# Patient Record
Sex: FEMALE | Race: BLACK OR AFRICAN AMERICAN | Employment: FULL TIME | URBAN - METROPOLITAN AREA
[De-identification: names, ages, dates, MRNs, and addresses within clinical notes are randomized per-mention and may not be internally consistent; named-entity substitution may affect disease eponyms.]

---

## 2017-04-16 ENCOUNTER — APPOINTMENT (OUTPATIENT)
Dept: ULTRASOUND IMAGING | Facility: CLINIC | Age: 22
DRG: 770 | End: 2017-04-16
Attending: EMERGENCY MEDICINE
Payer: COMMERCIAL

## 2017-04-16 ENCOUNTER — ANESTHESIA (OUTPATIENT)
Dept: SURGERY | Facility: CLINIC | Age: 22
DRG: 770 | End: 2017-04-16
Payer: COMMERCIAL

## 2017-04-16 ENCOUNTER — HOSPITAL ENCOUNTER (INPATIENT)
Facility: CLINIC | Age: 22
LOS: 1 days | Discharge: HOME | DRG: 770 | End: 2017-04-17
Attending: EMERGENCY MEDICINE | Admitting: SPECIALIST
Payer: COMMERCIAL

## 2017-04-16 ENCOUNTER — ANESTHESIA EVENT (OUTPATIENT)
Dept: SURGERY | Facility: CLINIC | Age: 22
DRG: 770 | End: 2017-04-16
Payer: COMMERCIAL

## 2017-04-16 DIAGNOSIS — O03.87: Primary | ICD-10-CM

## 2017-04-16 LAB
ANION GAP SERPL CALCULATED.3IONS-SCNC: 9 MMOL/L (ref 3–14)
BASOPHILS # BLD AUTO: 0 10E9/L (ref 0–0.2)
BASOPHILS NFR BLD AUTO: 0.1 %
BUN SERPL-MCNC: 8 MG/DL (ref 7–30)
CALCIUM SERPL-MCNC: 9.1 MG/DL (ref 8.5–10.1)
CHLORIDE SERPL-SCNC: 110 MMOL/L (ref 94–109)
CO2 SERPL-SCNC: 22 MMOL/L (ref 20–32)
CREAT SERPL-MCNC: 0.67 MG/DL (ref 0.52–1.04)
DIFFERENTIAL METHOD BLD: ABNORMAL
EOSINOPHIL # BLD AUTO: 0.2 10E9/L (ref 0–0.7)
EOSINOPHIL NFR BLD AUTO: 1.1 %
ERYTHROCYTE [DISTWIDTH] IN BLOOD BY AUTOMATED COUNT: 14.6 % (ref 10–15)
GFR SERPL CREATININE-BSD FRML MDRD: ABNORMAL ML/MIN/1.7M2
GLUCOSE SERPL-MCNC: 98 MG/DL (ref 70–99)
HCT VFR BLD AUTO: 32.8 % (ref 35–47)
HGB BLD-MCNC: 11.4 G/DL (ref 11.7–15.7)
IMM GRANULOCYTES # BLD: 0 10E9/L (ref 0–0.4)
IMM GRANULOCYTES NFR BLD: 0.2 %
LACTATE SERPL-SCNC: 0.8 MMOL/L (ref 0.4–2)
LYMPHOCYTES # BLD AUTO: 2.4 10E9/L (ref 0.8–5.3)
LYMPHOCYTES NFR BLD AUTO: 17.9 %
MCH RBC QN AUTO: 29.8 PG (ref 26.5–33)
MCHC RBC AUTO-ENTMCNC: 34.8 G/DL (ref 31.5–36.5)
MCV RBC AUTO: 86 FL (ref 78–100)
MICRO REPORT STATUS: ABNORMAL
MONOCYTES # BLD AUTO: 1 10E9/L (ref 0–1.3)
MONOCYTES NFR BLD AUTO: 7.8 %
NEUTROPHILS # BLD AUTO: 9.7 10E9/L (ref 1.6–8.3)
NEUTROPHILS NFR BLD AUTO: 72.9 %
NRBC # BLD AUTO: 0 10*3/UL
NRBC BLD AUTO-RTO: 0 /100
PLATELET # BLD AUTO: 199 10E9/L (ref 150–450)
POTASSIUM SERPL-SCNC: 3.4 MMOL/L (ref 3.4–5.3)
RBC # BLD AUTO: 3.82 10E12/L (ref 3.8–5.2)
SODIUM SERPL-SCNC: 141 MMOL/L (ref 133–144)
SPECIMEN SOURCE: ABNORMAL
WBC # BLD AUTO: 13.4 10E9/L (ref 4–11)
WET PREP SPEC: ABNORMAL

## 2017-04-16 PROCEDURE — 88305 TISSUE EXAM BY PATHOLOGIST: CPT | Performed by: SPECIALIST

## 2017-04-16 PROCEDURE — 96365 THER/PROPH/DIAG IV INF INIT: CPT

## 2017-04-16 PROCEDURE — 25800025 ZZH RX 258: Performed by: EMERGENCY MEDICINE

## 2017-04-16 PROCEDURE — 27210995 ZZH RX 272: Performed by: SPECIALIST

## 2017-04-16 PROCEDURE — 40000170 ZZH STATISTIC PRE-PROCEDURE ASSESSMENT II: Performed by: SPECIALIST

## 2017-04-16 PROCEDURE — 36415 COLL VENOUS BLD VENIPUNCTURE: CPT

## 2017-04-16 PROCEDURE — 96375 TX/PRO/DX INJ NEW DRUG ADDON: CPT

## 2017-04-16 PROCEDURE — 99285 EMERGENCY DEPT VISIT HI MDM: CPT | Mod: 25

## 2017-04-16 PROCEDURE — 25800025 ZZH RX 258: Performed by: SPECIALIST

## 2017-04-16 PROCEDURE — 25000132 ZZH RX MED GY IP 250 OP 250 PS 637: Performed by: ANESTHESIOLOGY

## 2017-04-16 PROCEDURE — 25800025 ZZH RX 258: Performed by: ANESTHESIOLOGY

## 2017-04-16 PROCEDURE — 37000008 ZZH ANESTHESIA TECHNICAL FEE, 1ST 30 MIN: Performed by: SPECIALIST

## 2017-04-16 PROCEDURE — 88305 TISSUE EXAM BY PATHOLOGIST: CPT | Mod: 26 | Performed by: SPECIALIST

## 2017-04-16 PROCEDURE — 25000132 ZZH RX MED GY IP 250 OP 250 PS 637: Performed by: SPECIALIST

## 2017-04-16 PROCEDURE — 96367 TX/PROPH/DG ADDL SEQ IV INF: CPT

## 2017-04-16 PROCEDURE — 25000125 ZZHC RX 250: Performed by: EMERGENCY MEDICINE

## 2017-04-16 PROCEDURE — 85025 COMPLETE CBC W/AUTO DIFF WBC: CPT | Performed by: EMERGENCY MEDICINE

## 2017-04-16 PROCEDURE — 00000159 ZZHCL STATISTIC H-SEND OUTS PREP: Performed by: SPECIALIST

## 2017-04-16 PROCEDURE — 80048 BASIC METABOLIC PNL TOTAL CA: CPT | Performed by: EMERGENCY MEDICINE

## 2017-04-16 PROCEDURE — 25000128 H RX IP 250 OP 636: Performed by: ANESTHESIOLOGY

## 2017-04-16 PROCEDURE — 87070 CULTURE OTHR SPECIMN AEROBIC: CPT | Performed by: EMERGENCY MEDICINE

## 2017-04-16 PROCEDURE — 12000007 ZZH R&B INTERMEDIATE

## 2017-04-16 PROCEDURE — 25000128 H RX IP 250 OP 636: Performed by: EMERGENCY MEDICINE

## 2017-04-16 PROCEDURE — 25000125 ZZHC RX 250: Performed by: NURSE ANESTHETIST, CERTIFIED REGISTERED

## 2017-04-16 PROCEDURE — 87591 N.GONORRHOEAE DNA AMP PROB: CPT | Performed by: EMERGENCY MEDICINE

## 2017-04-16 PROCEDURE — 25000128 H RX IP 250 OP 636: Performed by: SPECIALIST

## 2017-04-16 PROCEDURE — 71000012 ZZH RECOVERY PHASE 1 LEVEL 1 FIRST HR: Performed by: SPECIALIST

## 2017-04-16 PROCEDURE — 87106 FUNGI IDENTIFICATION YEAST: CPT | Performed by: EMERGENCY MEDICINE

## 2017-04-16 PROCEDURE — 83605 ASSAY OF LACTIC ACID: CPT | Performed by: EMERGENCY MEDICINE

## 2017-04-16 PROCEDURE — 87040 BLOOD CULTURE FOR BACTERIA: CPT | Performed by: EMERGENCY MEDICINE

## 2017-04-16 PROCEDURE — 25000132 ZZH RX MED GY IP 250 OP 250 PS 637: Performed by: EMERGENCY MEDICINE

## 2017-04-16 PROCEDURE — 25000125 ZZHC RX 250: Performed by: SPECIALIST

## 2017-04-16 PROCEDURE — 27210794 ZZH OR GENERAL SUPPLY STERILE: Performed by: SPECIALIST

## 2017-04-16 PROCEDURE — 37000009 ZZH ANESTHESIA TECHNICAL FEE, EACH ADDTL 15 MIN: Performed by: SPECIALIST

## 2017-04-16 PROCEDURE — 25000128 H RX IP 250 OP 636: Performed by: NURSE ANESTHETIST, CERTIFIED REGISTERED

## 2017-04-16 PROCEDURE — 96376 TX/PRO/DX INJ SAME DRUG ADON: CPT

## 2017-04-16 PROCEDURE — 36000056 ZZH SURGERY LEVEL 3 1ST 30 MIN: Performed by: SPECIALIST

## 2017-04-16 PROCEDURE — 87210 SMEAR WET MOUNT SALINE/INK: CPT | Performed by: EMERGENCY MEDICINE

## 2017-04-16 PROCEDURE — 76801 OB US < 14 WKS SINGLE FETUS: CPT

## 2017-04-16 PROCEDURE — 10D18ZZ EXTRACTION OF PRODUCTS OF CONCEPTION, RETAINED, VIA NATURAL OR ARTIFICIAL OPENING ENDOSCOPIC: ICD-10-PCS | Performed by: SPECIALIST

## 2017-04-16 PROCEDURE — 36000058 ZZH SURGERY LEVEL 3 EA 15 ADDTL MIN: Performed by: SPECIALIST

## 2017-04-16 PROCEDURE — 87491 CHLMYD TRACH DNA AMP PROBE: CPT | Performed by: EMERGENCY MEDICINE

## 2017-04-16 PROCEDURE — 96361 HYDRATE IV INFUSION ADD-ON: CPT

## 2017-04-16 PROCEDURE — 25000566 ZZH SEVOFLURANE, EA 15 MIN: Performed by: SPECIALIST

## 2017-04-16 RX ORDER — CEFOXITIN 2 G/1
2 INJECTION, POWDER, FOR SOLUTION INTRAVENOUS ONCE
Status: COMPLETED | OUTPATIENT
Start: 2017-04-16 | End: 2017-04-16

## 2017-04-16 RX ORDER — HYDROMORPHONE HYDROCHLORIDE 1 MG/ML
0.5 INJECTION, SOLUTION INTRAMUSCULAR; INTRAVENOUS; SUBCUTANEOUS
Status: DISCONTINUED | OUTPATIENT
Start: 2017-04-16 | End: 2017-04-17 | Stop reason: HOSPADM

## 2017-04-16 RX ORDER — SODIUM CHLORIDE, SODIUM LACTATE, POTASSIUM CHLORIDE, CALCIUM CHLORIDE 600; 310; 30; 20 MG/100ML; MG/100ML; MG/100ML; MG/100ML
INJECTION, SOLUTION INTRAVENOUS CONTINUOUS
Status: DISCONTINUED | OUTPATIENT
Start: 2017-04-16 | End: 2017-04-16 | Stop reason: HOSPADM

## 2017-04-16 RX ORDER — SODIUM CHLORIDE 9 MG/ML
INJECTION, SOLUTION INTRAVENOUS CONTINUOUS
Status: DISCONTINUED | OUTPATIENT
Start: 2017-04-16 | End: 2017-04-17 | Stop reason: HOSPADM

## 2017-04-16 RX ORDER — PROPOFOL 10 MG/ML
INJECTION, EMULSION INTRAVENOUS CONTINUOUS PRN
Status: DISCONTINUED | OUTPATIENT
Start: 2017-04-16 | End: 2017-04-16

## 2017-04-16 RX ORDER — LIDOCAINE HYDROCHLORIDE 20 MG/ML
INJECTION, SOLUTION INFILTRATION; PERINEURAL PRN
Status: DISCONTINUED | OUTPATIENT
Start: 2017-04-16 | End: 2017-04-16

## 2017-04-16 RX ORDER — MAGNESIUM HYDROXIDE 1200 MG/15ML
LIQUID ORAL PRN
Status: DISCONTINUED | OUTPATIENT
Start: 2017-04-16 | End: 2017-04-16 | Stop reason: HOSPADM

## 2017-04-16 RX ORDER — FENTANYL CITRATE 0.05 MG/ML
25-50 INJECTION, SOLUTION INTRAMUSCULAR; INTRAVENOUS
Status: DISCONTINUED | OUTPATIENT
Start: 2017-04-16 | End: 2017-04-16 | Stop reason: HOSPADM

## 2017-04-16 RX ORDER — ONDANSETRON 2 MG/ML
INJECTION INTRAMUSCULAR; INTRAVENOUS PRN
Status: DISCONTINUED | OUTPATIENT
Start: 2017-04-16 | End: 2017-04-16

## 2017-04-16 RX ORDER — FENTANYL CITRATE 50 UG/ML
INJECTION, SOLUTION INTRAMUSCULAR; INTRAVENOUS PRN
Status: DISCONTINUED | OUTPATIENT
Start: 2017-04-16 | End: 2017-04-16

## 2017-04-16 RX ORDER — OXYCODONE HYDROCHLORIDE 5 MG/1
5 TABLET ORAL EVERY 4 HOURS PRN
Status: DISCONTINUED | OUTPATIENT
Start: 2017-04-16 | End: 2017-04-17 | Stop reason: HOSPADM

## 2017-04-16 RX ORDER — DEXAMETHASONE SODIUM PHOSPHATE 4 MG/ML
INJECTION, SOLUTION INTRA-ARTICULAR; INTRALESIONAL; INTRAMUSCULAR; INTRAVENOUS; SOFT TISSUE PRN
Status: DISCONTINUED | OUTPATIENT
Start: 2017-04-16 | End: 2017-04-16

## 2017-04-16 RX ORDER — ACETAMINOPHEN 500 MG
1000 TABLET ORAL ONCE
Status: COMPLETED | OUTPATIENT
Start: 2017-04-16 | End: 2017-04-16

## 2017-04-16 RX ORDER — LIDOCAINE 40 MG/G
CREAM TOPICAL
Status: DISCONTINUED | OUTPATIENT
Start: 2017-04-16 | End: 2017-04-17 | Stop reason: HOSPADM

## 2017-04-16 RX ORDER — DOXYCYCLINE 100 MG/10ML
100 INJECTION, POWDER, LYOPHILIZED, FOR SOLUTION INTRAVENOUS ONCE
Status: COMPLETED | OUTPATIENT
Start: 2017-04-16 | End: 2017-04-16

## 2017-04-16 RX ORDER — SODIUM CHLORIDE, SODIUM LACTATE, POTASSIUM CHLORIDE, CALCIUM CHLORIDE 600; 310; 30; 20 MG/100ML; MG/100ML; MG/100ML; MG/100ML
INJECTION, SOLUTION INTRAVENOUS CONTINUOUS
Status: DISCONTINUED | OUTPATIENT
Start: 2017-04-16 | End: 2017-04-17 | Stop reason: HOSPADM

## 2017-04-16 RX ORDER — ONDANSETRON 2 MG/ML
4 INJECTION INTRAMUSCULAR; INTRAVENOUS EVERY 30 MIN PRN
Status: DISCONTINUED | OUTPATIENT
Start: 2017-04-16 | End: 2017-04-16 | Stop reason: HOSPADM

## 2017-04-16 RX ORDER — ALBUTEROL SULFATE 0.83 MG/ML
2.5 SOLUTION RESPIRATORY (INHALATION) EVERY 4 HOURS PRN
Status: DISCONTINUED | OUTPATIENT
Start: 2017-04-16 | End: 2017-04-16 | Stop reason: HOSPADM

## 2017-04-16 RX ORDER — ONDANSETRON 2 MG/ML
4 INJECTION INTRAMUSCULAR; INTRAVENOUS ONCE
Status: COMPLETED | OUTPATIENT
Start: 2017-04-16 | End: 2017-04-16

## 2017-04-16 RX ORDER — PROPOFOL 10 MG/ML
INJECTION, EMULSION INTRAVENOUS PRN
Status: DISCONTINUED | OUTPATIENT
Start: 2017-04-16 | End: 2017-04-16

## 2017-04-16 RX ORDER — ACETAMINOPHEN 325 MG/1
325-650 TABLET ORAL EVERY 6 HOURS PRN
Status: DISCONTINUED | OUTPATIENT
Start: 2017-04-16 | End: 2017-04-17 | Stop reason: HOSPADM

## 2017-04-16 RX ORDER — CEFOXITIN 2 G/1
2 INJECTION, POWDER, FOR SOLUTION INTRAVENOUS EVERY 6 HOURS
Status: DISCONTINUED | OUTPATIENT
Start: 2017-04-16 | End: 2017-04-17 | Stop reason: HOSPADM

## 2017-04-16 RX ORDER — IBUPROFEN 600 MG/1
600 TABLET, FILM COATED ORAL EVERY 6 HOURS PRN
Status: DISCONTINUED | OUTPATIENT
Start: 2017-04-16 | End: 2017-04-17 | Stop reason: HOSPADM

## 2017-04-16 RX ORDER — ONDANSETRON 2 MG/ML
4 INJECTION INTRAMUSCULAR; INTRAVENOUS EVERY 8 HOURS PRN
Status: DISCONTINUED | OUTPATIENT
Start: 2017-04-16 | End: 2017-04-17 | Stop reason: HOSPADM

## 2017-04-16 RX ORDER — KETOROLAC TROMETHAMINE 30 MG/ML
INJECTION, SOLUTION INTRAMUSCULAR; INTRAVENOUS PRN
Status: DISCONTINUED | OUTPATIENT
Start: 2017-04-16 | End: 2017-04-16

## 2017-04-16 RX ORDER — ONDANSETRON 4 MG/1
4 TABLET, ORALLY DISINTEGRATING ORAL EVERY 30 MIN PRN
Status: DISCONTINUED | OUTPATIENT
Start: 2017-04-16 | End: 2017-04-16 | Stop reason: HOSPADM

## 2017-04-16 RX ORDER — DOXYCYCLINE 100 MG/10ML
100 INJECTION, POWDER, LYOPHILIZED, FOR SOLUTION INTRAVENOUS EVERY 12 HOURS
Status: DISCONTINUED | OUTPATIENT
Start: 2017-04-16 | End: 2017-04-17 | Stop reason: HOSPADM

## 2017-04-16 RX ORDER — MEPERIDINE HYDROCHLORIDE 25 MG/ML
12.5 INJECTION INTRAMUSCULAR; INTRAVENOUS; SUBCUTANEOUS EVERY 5 MIN PRN
Status: DISCONTINUED | OUTPATIENT
Start: 2017-04-16 | End: 2017-04-16 | Stop reason: HOSPADM

## 2017-04-16 RX ADMIN — DOXYCYCLINE 100 MG: 100 INJECTION, POWDER, LYOPHILIZED, FOR SOLUTION INTRAVENOUS at 07:30

## 2017-04-16 RX ADMIN — SODIUM CHLORIDE, POTASSIUM CHLORIDE, SODIUM LACTATE AND CALCIUM CHLORIDE: 600; 310; 30; 20 INJECTION, SOLUTION INTRAVENOUS at 09:10

## 2017-04-16 RX ADMIN — IBUPROFEN 600 MG: 600 TABLET ORAL at 12:31

## 2017-04-16 RX ADMIN — PHENYLEPHRINE HYDROCHLORIDE 100 MCG: 10 INJECTION, SOLUTION INTRAMUSCULAR; INTRAVENOUS; SUBCUTANEOUS at 09:56

## 2017-04-16 RX ADMIN — CEFOXITIN 2 G: 2 INJECTION, POWDER, FOR SOLUTION INTRAVENOUS at 18:19

## 2017-04-16 RX ADMIN — MIDAZOLAM HYDROCHLORIDE 2 MG: 1 INJECTION, SOLUTION INTRAMUSCULAR; INTRAVENOUS at 09:28

## 2017-04-16 RX ADMIN — SODIUM CHLORIDE, POTASSIUM CHLORIDE, SODIUM LACTATE AND CALCIUM CHLORIDE: 600; 310; 30; 20 INJECTION, SOLUTION INTRAVENOUS at 09:18

## 2017-04-16 RX ADMIN — SODIUM CHLORIDE, POTASSIUM CHLORIDE, SODIUM LACTATE AND CALCIUM CHLORIDE: 600; 310; 30; 20 INJECTION, SOLUTION INTRAVENOUS at 06:55

## 2017-04-16 RX ADMIN — DEXMEDETOMIDINE 8 MCG: 100 INJECTION, SOLUTION, CONCENTRATE INTRAVENOUS at 09:48

## 2017-04-16 RX ADMIN — SUCCINYLCHOLINE CHLORIDE 100 MG: 20 INJECTION, SOLUTION INTRAMUSCULAR; INTRAVENOUS at 09:33

## 2017-04-16 RX ADMIN — OXYCODONE HYDROCHLORIDE 5 MG: 5 TABLET ORAL at 22:49

## 2017-04-16 RX ADMIN — FENTANYL CITRATE 50 MCG: 50 INJECTION, SOLUTION INTRAMUSCULAR; INTRAVENOUS at 09:33

## 2017-04-16 RX ADMIN — LIDOCAINE HYDROCHLORIDE 60 MG: 20 INJECTION, SOLUTION INFILTRATION; PERINEURAL at 09:35

## 2017-04-16 RX ADMIN — ACETAMINOPHEN 1000 MG: 500 TABLET, FILM COATED ORAL at 06:56

## 2017-04-16 RX ADMIN — SODIUM CHLORIDE 1000 ML: 9 INJECTION, SOLUTION INTRAVENOUS at 05:47

## 2017-04-16 RX ADMIN — CEFOXITIN 2 G: 2 INJECTION, POWDER, FOR SOLUTION INTRAVENOUS at 13:39

## 2017-04-16 RX ADMIN — PROPOFOL 200 MG: 10 INJECTION, EMULSION INTRAVENOUS at 09:33

## 2017-04-16 RX ADMIN — MEPERIDINE HYDROCHLORIDE 12.5 MG: 25 INJECTION, SOLUTION INTRAMUSCULAR; INTRAVENOUS; SUBCUTANEOUS at 10:49

## 2017-04-16 RX ADMIN — SODIUM CHLORIDE: 9 INJECTION, SOLUTION INTRAVENOUS at 23:33

## 2017-04-16 RX ADMIN — CEFOXITIN 2 G: 2 INJECTION, POWDER, FOR SOLUTION INTRAVENOUS at 06:56

## 2017-04-16 RX ADMIN — PROPOFOL 35 MCG/KG/MIN: 10 INJECTION, EMULSION INTRAVENOUS at 09:43

## 2017-04-16 RX ADMIN — OXYCODONE HYDROCHLORIDE 5 MG: 5 TABLET ORAL at 18:19

## 2017-04-16 RX ADMIN — PHENYLEPHRINE HYDROCHLORIDE 100 MCG: 10 INJECTION, SOLUTION INTRAMUSCULAR; INTRAVENOUS; SUBCUTANEOUS at 10:07

## 2017-04-16 RX ADMIN — DOXYCYCLINE 100 MG: 100 INJECTION, POWDER, LYOPHILIZED, FOR SOLUTION INTRAVENOUS at 20:53

## 2017-04-16 RX ADMIN — Medication 1 LOZENGE: at 11:14

## 2017-04-16 RX ADMIN — FENTANYL CITRATE 50 MCG: 50 INJECTION, SOLUTION INTRAMUSCULAR; INTRAVENOUS at 09:38

## 2017-04-16 RX ADMIN — PHENYLEPHRINE HYDROCHLORIDE 100 MCG: 10 INJECTION, SOLUTION INTRAMUSCULAR; INTRAVENOUS; SUBCUTANEOUS at 09:41

## 2017-04-16 RX ADMIN — PHENYLEPHRINE HYDROCHLORIDE 100 MCG: 10 INJECTION, SOLUTION INTRAMUSCULAR; INTRAVENOUS; SUBCUTANEOUS at 09:47

## 2017-04-16 RX ADMIN — ONDANSETRON 4 MG: 2 SOLUTION INTRAMUSCULAR; INTRAVENOUS at 05:48

## 2017-04-16 RX ADMIN — PROPOFOL 70 MG: 10 INJECTION, EMULSION INTRAVENOUS at 09:37

## 2017-04-16 RX ADMIN — DEXMEDETOMIDINE 12 MCG: 100 INJECTION, SOLUTION, CONCENTRATE INTRAVENOUS at 09:38

## 2017-04-16 RX ADMIN — ONDANSETRON 4 MG: 2 INJECTION INTRAMUSCULAR; INTRAVENOUS at 09:45

## 2017-04-16 RX ADMIN — HYDROMORPHONE HYDROCHLORIDE 0.5 MG: 1 INJECTION, SOLUTION INTRAMUSCULAR; INTRAVENOUS; SUBCUTANEOUS at 05:48

## 2017-04-16 RX ADMIN — SODIUM CHLORIDE: 9 INJECTION, SOLUTION INTRAVENOUS at 13:38

## 2017-04-16 RX ADMIN — HYDROMORPHONE HYDROCHLORIDE 0.5 MG: 1 INJECTION, SOLUTION INTRAMUSCULAR; INTRAVENOUS; SUBCUTANEOUS at 06:56

## 2017-04-16 RX ADMIN — SODIUM CHLORIDE, POTASSIUM CHLORIDE, SODIUM LACTATE AND CALCIUM CHLORIDE: 600; 310; 30; 20 INJECTION, SOLUTION INTRAVENOUS at 10:27

## 2017-04-16 RX ADMIN — KETOROLAC TROMETHAMINE 30 MG: 30 INJECTION, SOLUTION INTRAMUSCULAR at 10:10

## 2017-04-16 RX ADMIN — DEXAMETHASONE SODIUM PHOSPHATE 4 MG: 4 INJECTION, SOLUTION INTRA-ARTICULAR; INTRALESIONAL; INTRAMUSCULAR; INTRAVENOUS; SOFT TISSUE at 09:43

## 2017-04-16 ASSESSMENT — ENCOUNTER SYMPTOMS
ABDOMINAL PAIN: 1
VOMITING: 1
TREMORS: 1
HEADACHES: 1

## 2017-04-16 ASSESSMENT — LIFESTYLE VARIABLES: TOBACCO_USE: 0

## 2017-04-16 ASSESSMENT — PAIN DESCRIPTION - DESCRIPTORS: DESCRIPTORS: SORE

## 2017-04-16 NOTE — ANESTHESIA CARE TRANSFER NOTE
Patient: Alvin Suazo    Procedure(s):  HYSTEROSCOPY WITH DILATION AND CURETTAGE - Wound Class: II-Clean Contaminated   - Wound Class: II-Clean Contaminated    Diagnosis: SEPTIC   Diagnosis Additional Information: No value filed.    Anesthesia Type:   General, RSI     Note:  Airway :Face Mask  Patient transferred to:PACU  Comments: TOF 4/4 with head lift sustained for 5 seconds, spontaneous respirations, followed commands, oropharynx suctioned with soft flexible catheter, extubated atraumatically with suction. Airway patent after extubation. Oxygen via face mask at 10 LPM to PACU, connected to wall O2 in PACU. All monitors and alarms on and functioning. Report given to PACU RN and questions answered.       Vitals: (Last set prior to Anesthesia Care Transfer)    CRNA VITALS  2017 0957 - 2017 1032      2017             Pulse: 123    SpO2: 97 %    Resp Rate (observed): (!)  4    Resp Rate (set): 10                Electronically Signed By: ASHLEY Ariza CRNA  2017  10:32 AM

## 2017-04-16 NOTE — ED NOTES
"[facility]  ED Nurse Handoff Report    ED Chief complaint: Chills (pt. reports chills, body aches and muscle cramps.  pt[facility] and Abdominal Pain (lower abdominal pain)      ED Diagnosis:   Final diagnoses:   Septic embolism following abortive pregnancy       Code Status: Full Code    Allergies: No Known Allergies    Activity level - Baseline/Home:  Independent    Activity Level - Current:   Independent     Needed?: No    Isolation: No  Infection: Not Applicable    Bariatric?: No    Vital Signs:   Vitals:    04/16/17 0522 04/16/17 0725 04/16/17 0733   BP: 148/70 126/56    Pulse: 79     Resp: 18 16    Temp: 103.2  F (39.6  C)  98.7  F (37.1  C)   TempSrc: Oral  Oral   SpO2: 100% 97%    Weight: 75.8 kg (167 lb)     Height: 1.626 m (5' 4\")         Cardiac Rhythm: ,        Pain level: 0-10 Pain Scale: 5    Is this patient confused?: No    Patient Report: Initial Complaint  -  Fever, chills, abd pain  Focused Assessment: Pt had DNC on Thursday, Fever chills on subsequent days  Tests Performed: labs, blood cultures, pelvic exam with cultures  Abnormal Results: 13.4 WBC blood, neuts 9.7  Treatments provided: Fluids, pain medication, abx.    Family Comments: Infant son with pt. Family/friends will be coming to help with childcare    OBS brochure/video discussed/provided to patient: N/A    ED Medications:   Medications   HYDROmorphone (PF) (DILAUDID) injection 0.5 mg (0.5 mg Intravenous Given 4/16/17 0656)   cefOXitin (MEFOXIN) 2 g vial to attach to  mL bag (2 g Intravenous New Bag 4/16/17 0656)   doxycycline (VIBRAMYCIN) 100 mg vial to attach to  mL bag (not administered)   lactated ringers infusion ( Intravenous New Bag 4/16/17 0655)   0.9% sodium chloride BOLUS (1,000 mLs Intravenous New Bag 4/16/17 0547)   acetaminophen (TYLENOL) tablet 1,000 mg (1,000 mg Oral Given 4/16/17 0656)   ondansetron (ZOFRAN) injection 4 mg (4 mg " Intravenous Given 4/16/17 0548)       Drips infusing?:  Yes. LR and abx      ED NURSE PHONE NUMBER: [phone]

## 2017-04-16 NOTE — ANESTHESIA POSTPROCEDURE EVALUATION
Patient: Alvin Suazo    Procedure(s):  HYSTEROSCOPY WITH DILATION AND CURETTAGE - Wound Class: II-Clean Contaminated   - Wound Class: II-Clean Contaminated    Diagnosis:SEPTIC   Diagnosis Additional Information: No value filed.    Anesthesia Type:  General, RSI    Note:  Anesthesia Post Evaluation    Patient location during evaluation: PACU  Patient participation: Able to fully participate in evaluation  Level of consciousness: awake  Pain management: adequate  Airway patency: patent  Cardiovascular status: acceptable  Respiratory status: acceptable  Hydration status: acceptable  PONV: controlled     Anesthetic complications: None          Last vitals:  Vitals:    17 1110 17 1114 17 1120   BP:  111/75 121/77   Pulse:      Resp: 16 18 15   Temp:      SpO2: 97% 97% 98%         Electronically Signed By: Essence Bolden MD  2017  11:27 AM

## 2017-04-16 NOTE — ANESTHESIA PREPROCEDURE EVALUATION
Anesthesia Evaluation     . Pt has had prior anesthetic.     No history of anesthetic complications          ROS/MED HX    ENT/Pulmonary:      (-) tobacco use, asthma and sleep apnea   Neurologic:       Cardiovascular:        (-) SIDHU   METS/Exercise Tolerance:  >4 METS   Hematologic:     (+) Anemia, -      Musculoskeletal:         GI/Hepatic: Comment: N and V       (-) GERD   Renal/Genitourinary:         Endo: Comment: 3 days ago 9 weeks gest Ab        Psychiatric:         Infectious Disease: Comment: Septic Ab, inc. WBC, nl LA, pelvic pain, myalgias, fever, chills, HA, tremors  (+) Recent Fever,       Malignancy:         Other:                     Physical Exam      Airway   Mallampati: II  TM distance: >3 FB  Neck ROM: full    Dental   (+) chipped    Cardiovascular   Rhythm and rate: regular      Pulmonary    breath sounds clear to auscultation                    Anesthesia Plan      History & Physical Review  History and physical reviewed and following examination; no interval change.    ASA Status:  2 emergent.        Plan for General and RSI   PONV prophylaxis:  Ondansetron (or other 5HT-3)  Additional equipment: Videolaryngoscope Propofol infusion      Postoperative Care      Consents  Anesthetic plan, risks, benefits and alternatives discussed with:  Patient.  Use of blood products discussed: Yes.   Use of blood products discussed with Patient.  .                          .  Procedure: Procedure(s):  DILATION AND CURETTAGE SUCTION  OPERATIVE HYSTEROSCOPY  Preop diagnosis: SEPTIC     No Known Allergies  Past Medical History:   Diagnosis Date     Gonorrhea      NO ACTIVE PROBLEMS      Past Surgical History:   Procedure Laterality Date     DILATION AND CURETTAGE SUCTION  2012    Procedure:DILATION AND CURETTAGE SUCTION; Suction Dilation and Curettage under Ultrasound Guidance 15weeks; Surgeon:JOSSY WHITT; Location:[facility] OR     Prior to Admission medications    Medication Sig Start  Date End Date Taking? Authorizing Provider   IBUPROFEN PO Take 400 mg by mouth every 6 hours as needed for moderate pain   Yes Unknown, Entered By History     Current Facility-Administered Medications Ordered in Epic   Medication Dose Route Frequency Last Rate Last Dose     [Auto Hold] HYDROmorphone (PF) (DILAUDID) injection 0.5 mg  0.5 mg Intravenous Q15 Min PRN   0.5 mg at 04/16/17 0656     lactated ringers infusion   Intravenous Continuous   Stopped at 04/16/17 0856     No Pre Procedure Antibiotic Needed  1 each As instructed Continuous         lidocaine 1 % 1 mL  1 mL Other Q1H PRN         lactated ringers infusion   Intravenous Continuous 25 mL/hr at 04/16/17 0910       No current [facility]-ordered outpatient prescriptions on file.     Wt Readings from Last 1 Encounters:   04/16/17 75.8 kg (167 lb)     Temp Readings from Last 1 Encounters:   04/16/17 37.1  C (98.7  F) (Oral)     BP Readings from Last 6 Encounters:   04/16/17 126/75   06/20/16 127/72   07/16/13 110/60   03/18/13 120/65   03/02/13 129/69   02/24/13 111/64     Pulse Readings from Last 4 Encounters:   04/16/17 79   07/16/13 60   03/02/13 71   02/24/13 64     Resp Readings from Last 1 Encounters:   04/16/17 16     SpO2 Readings from Last 1 Encounters:   04/16/17 99%     Recent Labs   Lab Test  04/16/17   0540  03/02/13   1750   NA  141  141   POTASSIUM  3.4  3.8   CHLORIDE  110*  105   CO2  22  21   ANIONGAP  9  15.8   GLC  98  91   BUN  8  10   CR  0.67  0.80   SABAS  9.1  9.9     Recent Labs   Lab Test  04/16/17   0540  03/02/13   1750   WBC  13.4*  8.1   HGB  11.4*  14.8   PLT  199  238     No results for input(s): INR in the last 79978 hours.    Invalid input(s): APTT   RECENT LABS:   ECG:   ECHO:   CXR:

## 2017-04-16 NOTE — ED PROVIDER NOTES
History     Chief Complaint:  Chills    HPI   Alvin Suazo is a 21 year old  female who presents to the emergency department today with her son for evaluation of chills. The patient states that she flew up to [place] from [place] for a dilation and curettage that was performed on Thursday, 2017 (she was 9 weeks pregnant). The patient reports that since her procedure she has had no bleeding but she has had a headache and some abdominal pain and this morning the patient reports that she couldn't stop shaking. She reports that her abdominal pain is not like her menstrual cramps but rather like the pain she had when her  section was healing (severe and low across the abd/pelvis). She also reports some cramping in her buttocks and some leg pain. She denies any rectal or perianal pain. The patient states that she took 800 mg Ibuprofen this morning but then threw it up.   Allergies:  No Known Drug Allergies    Medications:    No current outpatient prescriptions on file.    Past Medical History:    Gonorrhea    Past Surgical History:    Dilation and curettage suction    Family History:    No family history on file.    Social History:  The patient was accompanied to the ED by her son.  Smoking Status: Former Smoker -- 2015  Alcohol Use: Positive  Marital Status:  Single [1]     Review of Systems   Gastrointestinal: Positive for abdominal pain and vomiting.   Genitourinary: Negative for vaginal bleeding.   Musculoskeletal:        Leg pain and buttock cramping   Neurological: Positive for tremors and headaches.   All other systems reviewed and are negative.    Physical Exam   Vitals:  Patient Vitals for the past 24 hrs:   BP Temp Temp src Pulse Heart Rate Resp SpO2 Height Weight   17 1130 120/64 98.4  F (36.9  C) - - 77 16 97 % - -   17 1120 121/77 - - - 66 15 98 % - -   17 1114 111/75 - - - 73 18 97 % - -   17 1110 - - - - 68 16 97 % - -   17 1100 - - - - - 24 96 % -  "-   04/16/17 1050 120/68 - - - 80 23 98 % - -   04/16/17 1040 121/62 - - - 75 13 100 % - -   04/16/17 1030 119/70 - - - 103 22 99 % - -   04/16/17 1028 126/62 98.8  F (37.1  C) Temporal - 87 12 99 % - -   04/16/17 0850 126/75 - - - 84 16 99 % - -   04/16/17 0733 - 98.7  F (37.1  C) Oral - - - - - -   04/16/17 0725 126/56 - - - 92 16 97 % - -   04/16/17 0522 148/70 103.2  F (39.6  C) Oral 79 - 18 100 % 1.626 m (5' 4\") 75.8 kg (167 lb)     Physical Exam  General/Appearance: appears stated age, well-groomed, appears moderately uncomfortable, shivering, very warm to the touch  Eyes: EOMI, no scleral injection, no icterus  ENT: MMM  Neck: supple, nl ROM, no stiffness  Cardiovascular: RRR, nl S1S2, no m/r/g, 2+ pulses in all 4 extremities, cap refill <2sec  Respiratory: CTAB, good air movement throughout, no wheezes/rhonchi/rales, no increased WOB, no retractions  Back: no lesions  GI: abd soft, moderate suprapubic ttp,  no HSM, no rebound, no guarding, nl BS  : nl external genitalia without skin lesions, nl vaginal canal, + thick purulence from cervix. Bimanual: mild CMT, no palpable uterine mass, significant uterine ttp, nl adnexa without ttp.  MSK: WESLEY, good tone, no bony abnormality  Skin: warm and well-perfused, no rash, no edema, no ecchymosis, nl turgor  Neuro: GCS 15, alert and oriented, no gross focal neuro deficits  Psych: interacts appropriately  Heme: no petechia, no purpura, no active bleeding    Emergency Department Course     Imaging:  Radiology findings were communicated with the patient who voiced understanding of the findings.    OB  US 1st trimester w transvag   Final Result   IMPRESSION:    1. The endometrium is markedly thickened and heterogeneous, likely due   to the presence of blood products within the endometrial canal.   Retained products of conception, however, cannot entirely be excluded   from this appearance.   2. Small amount of nonspecific free fluid in the pelvis.      RAEANN JOE, " MD        Laboratory:  Laboratory findings were communicated with the patient who voiced understanding of the findings.    Genital Culture Aerobic Bacterial: Pending  CBC: WBC 13.4 (H), HGB 11.4 (L),   BMP: Chloride 110 (H) o/w WNL (Creatinine 0.67)  Lactic Acid: 0.8  Blood Culture: Pending   Wet Prep - + clue cells, no trich, no yeast      Interventions:  0547 NS 1000 ml IV   0548 Dilaudid 0.5 mg IV  0548 Zofran 4 mg IV  06 45 LR Infusion  0655 2g Cefoxitin  Doxy ordered and pending  Medications   HYDROmorphone (PF) (DILAUDID) injection 0.5 mg ( Intravenous Unhold 4/16/17 1130)   lactated ringers infusion ( Intravenous New Bag 4/16/17 1027)   cefOXitin (MEFOXIN) 2 g vial to attach to  mL bag (not administered)   doxycycline (VIBRAMYCIN) 100 mg vial to attach to  mL bag (not administered)   ondansetron (ZOFRAN) injection 4 mg (not administered)   lidocaine 1 % 1 mL (not administered)   lidocaine (LMX4) cream (not administered)   sodium chloride (PF) 0.9% PF flush 3 mL (not administered)   sodium chloride (PF) 0.9% PF flush 3 mL (not administered)   0.9% sodium chloride infusion (not administered)   acetaminophen (TYLENOL) tablet 325-650 mg (not administered)   oxyCODONE (ROXICODONE) IR tablet 5 mg (not administered)   ibuprofen (ADVIL/MOTRIN) tablet 600 mg (not administered)   benzocaine-menthol (CHLORASEPTIC) 6-10 MG lozenge 1 lozenge (1 lozenge Buccal Given 4/16/17 1114)   0.9% sodium chloride BOLUS (0 mLs Intravenous Stopped 4/16/17 0709)   acetaminophen (TYLENOL) tablet 1,000 mg (1,000 mg Oral Given 4/16/17 0656)   ondansetron (ZOFRAN) injection 4 mg (4 mg Intravenous Given 4/16/17 0548)   cefOXitin (MEFOXIN) 2 g vial to attach to  mL bag (0 g Intravenous Stopped 4/16/17 0728)   doxycycline (VIBRAMYCIN) 100 mg vial to attach to  mL bag (0 mg Intravenous Stopped 4/16/17 0828)     Emergency Department Course:  Nursing notes and vitals reviewed.  I performed an exam of the patient as  documented above.   IV was inserted and blood was drawn for laboratory testing, results above.    I personally reviewed the  results with the pt and answered all related questions prior to admission.  Impression & Plan      Medical Decision Making:  This pt is a 22yo A1 female, s/p D&C three days ago at [facility] of a 9 week pregnancy, who presents with abd pain and fever. She has had increasing lower suprapubic/lower abd pain since the procedure and well as subjective fevers, shakes/chills. She has purulent material coming from the cervix as well as uterine ttp on exam and retained products vs heterogenous materials in the uterus on US.  Her WBC is elevated in additional to a high fever but her lactate is wnl. This is all consistent with septic /endometritis. She has cultures pending and is being started on abx (Cefoxitin and Doxy). I spoke with Dr Hardy of OB who will come in and take definitive care of the pt.    Diagnosis:      ICD-10-CM    1. Legal  with sepsis O04.5      Disposition:   Admission    Scribe Disclosure:  Julien CAMPOS, am serving as a scribe at 5:36 AM on 2017 to document services personally performed by Zahida Marin MD, based on my observations and the provider's statements to me.     EMERGENCY DEPARTMENT       Zahida Marin MD  [phone]       Zahida Marin MD  17 [phone]

## 2017-04-16 NOTE — PLAN OF CARE
Problem: Goal Outcome Summary  Goal: Goal Outcome Summary  Outcome: No Change  Up from PACU at 1130.  VS stable.  Scant amount of vaginal discharge on pad at admission.  Only c/o HA and abdominal cramping, controlled with Ibuprofen.

## 2017-04-16 NOTE — IP AVS SNAPSHOT
MRN:[id number]                      After Visit Summary   4/16/2017    Alvin Suazo    MRN: [id number]           Thank you!     Thank you for choosing [facility] for your care. Our goal is always to provide you with excellent care. Hearing back from our patients is one way we can continue to improve our services. Please take a few minutes to complete the written survey that you may receive in the mail after you visit with us. Thank you!        Patient Information     Date Of Birth          1995        Designated Caregiver       Most Recent Value    Caregiver    Will someone help with your care after discharge? yes    Name of designated caregiver Rani Suazo - mother    Phone number of caregiver ?    Caregiver address ?      About your hospital stay     You were admitted on:  April 16, 2017 You last received care in the:  [place] [facility] [address] [facility]    You were discharged on:  April 17, 2017       Who to Call     For medical emergencies, please call 911.  For non-urgent questions about your medical care, please call your primary care provider or clinic, None  For questions related to your surgery, please call your surgery clinic        Attending Provider     Provider Specialty    Zahida Marin MD Emergency Medicine    [place] MD OB/Gyn       Primary Care Provider    Physician No Ref-Primary       No address on file        After Care Instructions     Discharge Instructions       Resume pre procedure diet            Discharge Instructions       Pelvic Rest. No tampons, douching or intercourse for  2 weeks.            Discharge Instructions       Patient to arrange follow up appointment in 2 weeks                  Pending Results     Date and Time Order Name Status Description    4/16/2017 1017 Surgical pathology exam In process     4/16/2017 0608 Chlamydia trachomatis PCR In process     4/16/2017 0608 Neisseria gonorrhoeae PCR In process     4/16/2017  "0557 Blood culture Preliminary     2017 0557 Blood culture Preliminary     2017 0534 Genital Culture Aerobic Bacterial Preliminary             Admission Information     Date & Time Provider Department Dept. Phone    2017 Melissa Hardy MD [place] [address] [facility] [phone]      Your Vitals Were     Blood Pressure Pulse Temperature Respirations Height Weight    126/75 (BP Location: Right arm) 67 98.5  F (36.9  C) (Oral) 18 1.626 m (5' 4\") 76.7 kg (169 lb 1.5 oz)    Pulse Oximetry BMI (Body Mass Index)                96% 29.02 kg/m2          MyChart Information     [facility] lets you send messages to your doctor, view your test results, renew your prescriptions, schedule appointments and more. To sign up, go to [email or web address] . Click on \"Log in\" on the left side of the screen, which will take you to the Welcome page. Then click on \"Sign up Now\" on the right side of the page.     You will be asked to enter the access code listed below, as well as some personal information. Please follow the directions to create your username and password.     Your access code is: [email or web address]  Expires: 2017  6:15 PM     Your access code will  in 90 days. If you need help or a new code, please call your [place] clinic or [phone].        Care EveryWhere ID     This is your Care EveryWhere ID. This could be used by other organizations to access your [place] medical records  [id number]           Review of your medicines      START taking        Dose / Directions    doxycycline Monohydrate 100 MG Tabs        Dose:  100 mg   Take 100 mg by mouth 2 times daily for 14 days   Quantity:  28 tablet   Refills:  0       oxyCODONE 5 MG IR tablet   Commonly known as:  ROXICODONE        Dose:  5 mg   Take 1 tablet (5 mg) by mouth every 4 hours as needed for moderate to severe pain   Quantity:  10 tablet   Refills:  0         CONTINUE these medicines which have NOT CHANGED        " Dose / Directions    IBUPROFEN PO        Dose:  400 mg   Take 400 mg by mouth every 6 hours as needed for moderate pain   Refills:  0            Where to get your medicines      Some of these will need a paper prescription and others can be bought over the counter. Ask your nurse if you have questions.     Bring a paper prescription for each of these medications     doxycycline Monohydrate 100 MG Tabs    oxyCODONE 5 MG IR tablet                Protect others around you: Learn how to safely use, store and throw away your medicines at www.disposemymeds.org.             Medication List: This is a list of all your medications and when to take them. Check marks below indicate your daily home schedule. Keep this list as a reference.      Medications           Morning Afternoon Evening Bedtime As Needed    doxycycline Monohydrate 100 MG Tabs   Take 100 mg by mouth 2 times daily for 14 days                                IBUPROFEN PO   Take 400 mg by mouth every 6 hours as needed for moderate pain   Last time this was given:  600 mg on 4/17/2017 12:53 PM                                oxyCODONE 5 MG IR tablet   Commonly known as:  ROXICODONE   Take 1 tablet (5 mg) by mouth every 4 hours as needed for moderate to severe pain   Last time this was given:  5 mg on 4/16/2017 10:49 PM

## 2017-04-16 NOTE — ED NOTES
Report given to DANNIELLE Osborne in preop. Surgery is ready, awaiting arrival of family member to  son.

## 2017-04-16 NOTE — IP AVS SNAPSHOT
[facility] [facility] [address] [facility]    [address] [place] [address]    Phone:  [phone]                                       After Visit Summary   4/16/2017    Alvin Suazo    MRN: [id number]           After Visit Summary Signature Page     I have received my discharge instructions, and my questions have been answered. I have discussed any challenges I see with this plan with the nurse or doctor.    ..........................................................................................................................................  Patient/Patient Representative Signature      ..........................................................................................................................................  Patient Representative Print Name and Relationship to Patient    ..................................................               ................................................  Date                                            Time    ..........................................................................................................................................  Reviewed by Signature/Title    ...................................................              ..............................................  Date                                                            Time

## 2017-04-16 NOTE — PROCEDURES
"OPERATIVE NOTE: HYSTEROSCOPY     Procedure reviewed with patient, risks discussed, including but not limited to bleeding, infection, uterine perforation, transfusion. All questions answered. Consent obtained.     Pre-op Diagnosis: septic , thickened endometrium on ultrasound  Post-op Diagnosis: same- no retained products of conception visualized  surgeon-Melissa Hardy MD  anesthesia-general  findings-Endometrium filled with blood. Hysteroscopically, the cavity noted to be clean after D and C.  Procedure- hysteroscopy, D and C  Specimens-intrauterine contents  EBL-10 cc  Complications-none apparent  Fluid Deficient: 100 cc saline           Procedure-  After general anesthesia was attained, the patient was prepped and draped in the usual fashion. Her bladder was drained.  A timeout for \"pause for the cause\" then occurred and all in attendance were in agreement with the planned procedure, as noted on the signed surgical consent form.      A speculum was placed in the vagina. The cervix was then prepped and grasped with a single tooth tenaculum.      The cervix was open enough to admit a diagnostic hysteroscope. Saline was used as the distending medium. The hysteroscope was then inserted into the uterine cavity. The uterine lining was noted to be thickened. No retained products of conception were visualized. Sharp curetting was performed. Dark clotted blood and bright red clots were obtained. The hysteroscope was re-inserted and the cavity was clean. Hemostasis was noted.      The patient tolerated the procedure well and was transferred to the recovery room in satisfactory condition.     She will be admitted for intravenous antibiotic administration.     Melissa Hardy .................... 2017 10:33 AM , pager: [phone]                                 "

## 2017-04-16 NOTE — CONSULTS
"Gyn Consult    20 yo  presented with pelvic pain, muscle aches and chills this morning after a D and C for termination of a 9 weeks pregnancy 3 days ago.  She also notes that her  scar is puffy and has pain there. She reports a headache.  She is originally from [place] but had recently moved to [place].  She traveled to [place] for the procedure at [facility].  She has family that live in town.  She is with her 9 month old son.  She does not have a significant other.  She reports a history of retained tissue after a previous D and C done for a miscarriage in the past.    ROS:  As above    Past Medical Hx:  Gonorrhea    Past Surgical Hx:   9 months ago, D and C (3 days ago) and D and C for SAB in the past    Family Hx:  noncontribuatory    Social Hx:  Single- no significant other, non smoker, non-drinker, denies drug use    Exam:  /56  Pulse 79  Temp 98.7  F (37.1  C) (Oral)  Resp 16  Ht 1.626 m (5' 4\")  Wt 75.8 kg (167 lb)  SpO2 97%  BMI 28.67 kg/m2   Her temperature max in the ED is 103.2  Gen:  NAD  CV:  RRR  Lungs:  clear  Abd:  exquisitely tender over pelvis, further exam deferred to the OR  Ext:  nontender    Labs significant for WBC 13.4, hgb 11.4  US:  Thickened endometrium, unable to rule out retained products of conception    Assessment:  Septic , stable vitals  Plan:  Recommended hysteroscopy, D and C- discussed risks and benefits.  Risks include but not limited to scarring, additional surgery, bleeding, transfusion, injury to to other organs and infection.  She elects to proceed.      I was contacted regarding this patient at 6:45 this am and I saw her at 7:40.    Melissa Hardy ....................  2017   8:18 AM , pager: [phone]      Plan:     "

## 2017-04-16 NOTE — PROCEDURES
"OPERATIVE NOTE: HYSTEROSCOPY     Procedure reviewed with patient, risks discussed, including but not limited to bleeding, infection, uterine perforation, transfusion. All questions answered. Consent obtained.     Pre-op Diagnosis: septic , thickened endometrium on ultrasound  Post-op Diagnosis: same- no retained products of conception visualized  surgeon-Melissa Hardy MD  anesthesia-general  findings-Endometrium filled with blood. Hysteroscopically, the cavity noted to be clean after D and C.  Procedure- hysteroscopy, D and C  Specimens-intrauterine contents  EBL-10 cc  Complications-none apparent  Fluid Deficient: 100 cc saline           Procedure-  After general anesthesia was attained, the patient was prepped and draped in the usual fashion. Her bladder was drained.  A timeout for \"pause for the cause\" then occurred and all in attendance were in agreement with the planned procedure, as noted on the signed surgical consent form.      A speculum was placed in the vagina. The cervix was then prepped and grasped with a single tooth tenaculum.      The cervix was open enough to admit a diagnostic hysteroscope. Saline was used as the distending medium. The hysteroscope was then inserted into the uterine cavity. The uterine lining was noted to be thickened. No retained products of conception were visualized. Sharp curetting was performed. Dark clotted blood and bright red clots were obtained. The hysteroscope was re-inserted and the cavity was clean. Hemostasis was noted.      The patient tolerated the procedure well and was transferred to the recovery room in satisfactory condition.     She will be admitted for intravenous antibiotic administration.     Melissa Hardy .................... 2017 10:33 AM , pager: [phone]                                 "

## 2017-04-16 NOTE — PHARMACY
"Prescriber Notification Note    The pharmacist has communicated with this patient's provider regarding a concern or therapy recommendation.    Notified Person: Dr. Melissa Hardy (OB)  Date/Time of Notification: 4/16/17 @ 8170  Interaction: phone  Concern/Recommendation: Post op abx ordered as \"once\" - did we want to schedule them?     Comments/Additional Details:Yes schedule cefoxitin to q6h and doxycycline to q12h.    Lyndsay Patton, PharmD      "

## 2017-04-17 VITALS
BODY MASS INDEX: 28.87 KG/M2 | TEMPERATURE: 98.5 F | HEIGHT: 64 IN | RESPIRATION RATE: 16 BRPM | HEART RATE: 67 BPM | DIASTOLIC BLOOD PRESSURE: 75 MMHG | WEIGHT: 169.09 LBS | SYSTOLIC BLOOD PRESSURE: 126 MMHG | OXYGEN SATURATION: 96 %

## 2017-04-17 LAB
BASOPHILS # BLD AUTO: 0 10E9/L (ref 0–0.2)
BASOPHILS NFR BLD AUTO: 0 %
C TRACH DNA SPEC QL NAA+PROBE: NORMAL
DIFFERENTIAL METHOD BLD: ABNORMAL
EOSINOPHIL # BLD AUTO: 0 10E9/L (ref 0–0.7)
EOSINOPHIL NFR BLD AUTO: 0.1 %
ERYTHROCYTE [DISTWIDTH] IN BLOOD BY AUTOMATED COUNT: 14.7 % (ref 10–15)
HCT VFR BLD AUTO: 29.7 % (ref 35–47)
HGB BLD-MCNC: 10 G/DL (ref 11.7–15.7)
IMM GRANULOCYTES # BLD: 0.1 10E9/L (ref 0–0.4)
IMM GRANULOCYTES NFR BLD: 0.4 %
LYMPHOCYTES # BLD AUTO: 2.4 10E9/L (ref 0.8–5.3)
LYMPHOCYTES NFR BLD AUTO: 17 %
MCH RBC QN AUTO: 29.2 PG (ref 26.5–33)
MCHC RBC AUTO-ENTMCNC: 33.7 G/DL (ref 31.5–36.5)
MCV RBC AUTO: 87 FL (ref 78–100)
MONOCYTES # BLD AUTO: 0.9 10E9/L (ref 0–1.3)
MONOCYTES NFR BLD AUTO: 6.6 %
N GONORRHOEA DNA SPEC QL NAA+PROBE: NORMAL
NEUTROPHILS # BLD AUTO: 10.6 10E9/L (ref 1.6–8.3)
NEUTROPHILS NFR BLD AUTO: 75.9 %
NRBC # BLD AUTO: 0 10*3/UL
NRBC BLD AUTO-RTO: 0 /100
PLATELET # BLD AUTO: 194 10E9/L (ref 150–450)
RBC # BLD AUTO: 3.42 10E12/L (ref 3.8–5.2)
SPECIMEN SOURCE: NORMAL
SPECIMEN SOURCE: NORMAL
WBC # BLD AUTO: 13.9 10E9/L (ref 4–11)

## 2017-04-17 PROCEDURE — 25000132 ZZH RX MED GY IP 250 OP 250 PS 637: Performed by: SPECIALIST

## 2017-04-17 PROCEDURE — 85025 COMPLETE CBC W/AUTO DIFF WBC: CPT | Performed by: SPECIALIST

## 2017-04-17 PROCEDURE — 25000125 ZZHC RX 250: Performed by: SPECIALIST

## 2017-04-17 PROCEDURE — 36415 COLL VENOUS BLD VENIPUNCTURE: CPT | Performed by: SPECIALIST

## 2017-04-17 PROCEDURE — 25000128 H RX IP 250 OP 636: Performed by: SPECIALIST

## 2017-04-17 RX ORDER — DOXYCYCLINE 100 MG/1
100 TABLET ORAL 2 TIMES DAILY
Qty: 28 TABLET | Refills: 0 | Status: SHIPPED | OUTPATIENT
Start: 2017-04-17 | End: 2017-05-01

## 2017-04-17 RX ORDER — NALOXONE HYDROCHLORIDE 0.4 MG/ML
.1-.4 INJECTION, SOLUTION INTRAMUSCULAR; INTRAVENOUS; SUBCUTANEOUS
Status: DISCONTINUED | OUTPATIENT
Start: 2017-04-17 | End: 2017-04-17 | Stop reason: HOSPADM

## 2017-04-17 RX ORDER — HYDROCODONE BITARTRATE AND ACETAMINOPHEN 5; 325 MG/1; MG/1
1 TABLET ORAL EVERY 6 HOURS PRN
Status: DISCONTINUED | OUTPATIENT
Start: 2017-04-17 | End: 2017-04-17 | Stop reason: HOSPADM

## 2017-04-17 RX ORDER — OXYCODONE HYDROCHLORIDE 5 MG/1
5 TABLET ORAL EVERY 4 HOURS PRN
Qty: 10 TABLET | Refills: 0 | Status: SHIPPED | OUTPATIENT
Start: 2017-04-17

## 2017-04-17 RX ADMIN — DOXYCYCLINE 100 MG: 100 INJECTION, POWDER, LYOPHILIZED, FOR SOLUTION INTRAVENOUS at 08:31

## 2017-04-17 RX ADMIN — SODIUM CHLORIDE: 9 INJECTION, SOLUTION INTRAVENOUS at 11:03

## 2017-04-17 RX ADMIN — CEFOXITIN 2 G: 2 INJECTION, POWDER, FOR SOLUTION INTRAVENOUS at 00:54

## 2017-04-17 RX ADMIN — ACETAMINOPHEN 650 MG: 325 TABLET, FILM COATED ORAL at 07:31

## 2017-04-17 RX ADMIN — IBUPROFEN 600 MG: 600 TABLET ORAL at 01:02

## 2017-04-17 RX ADMIN — CEFOXITIN 2 G: 2 INJECTION, POWDER, FOR SOLUTION INTRAVENOUS at 12:49

## 2017-04-17 RX ADMIN — CEFOXITIN 2 G: 2 INJECTION, POWDER, FOR SOLUTION INTRAVENOUS at 06:06

## 2017-04-17 RX ADMIN — IBUPROFEN 600 MG: 600 TABLET ORAL at 12:53

## 2017-04-17 RX ADMIN — HYDROCODONE BITARTRATE AND ACETAMINOPHEN 1 TABLET: 5; 325 TABLET ORAL at 11:03

## 2017-04-17 NOTE — PROGRESS NOTES
"POST OP DAY #1 S/P hysteroscopy, D and C for septic AB    Feels poorly, states is having more pain.  Shakes when up walking.   Very stressed because has to take care of son.   Grandma in on her way to help.  Crying   Pain is poorly controlled.  Percocet just makes her tired.      Vitals: /79 (BP Location: Right arm)  Pulse 75  Temp 98.7  F (37.1  C)  Resp 18  Ht 1.626 m (5' 4\")  Wt 76.7 kg (169 lb 1.5 oz)  SpO2 100%  BMI 29.02 kg/m2  BMI= Body mass index is 29.02 kg/(m^2).  Hemoglobin   Date Value Ref Range Status   2017 10.0 (L) 11.7 - 15.7 g/dL Final       Intake/Output Summary (Last 24 hours) at 17 0804  Last data filed at 17 0600   Gross per 24 hour   Intake             3473 ml   Output             2060 ml   Net             1413 ml       Past Medical History:   Diagnosis Date     Gonorrhea      NO ACTIVE PROBLEMS        Assessment: Active Problems:     with sepsis    S/P Hysteroscopy D and C    Plan- will try Vicoden today for pain improvement, rest today.   Reevaluate later today.   Possible discharge if pain improves and remains afebrile  All questions were answered per patient's stated satisfaction.    Pam Giraldo MD  2017, 8:04 AM    "

## 2017-04-17 NOTE — DISCHARGE SUMMARY
[facility] Discharge Summary    Alvin Suazo MRN# [id number]   Age: 21 year old YOB: 1995     Date of Admission:  2017  Date of Discharge::  2017   Admitting Physician:  Melissa Hardy  Discharge Physician:  Pam Giraldo MD     Home clinic: [facility]          Admission Diagnoses:   Septic           Discharge Diagnosis:   Septic           Procedures:   Procedure(s):  Hysteroscopy, D and c       No other procedures performed during this admission           Medications Prior to Admission:     Prescriptions Prior to Admission   Medication Sig Dispense Refill Last Dose     IBUPROFEN PO Take 400 mg by mouth every 6 hours as needed for moderate pain   2017 at Unknown time             Discharge Medications:     Current Discharge Medication List      START taking these medications    Details   oxyCODONE (ROXICODONE) 5 MG IR tablet Take 1 tablet (5 mg) by mouth every 4 hours as needed for moderate to severe pain  Qty: 10 tablet, Refills: 0    Associated Diagnoses:  with sepsis      doxycycline Monohydrate 100 MG TABS Take 100 mg by mouth 2 times daily for 14 days  Qty: 28 tablet, Refills: 0    Associated Diagnoses:  with sepsis         CONTINUE these medications which have NOT CHANGED    Details   IBUPROFEN PO Take 400 mg by mouth every 6 hours as needed for moderate pain                   Consultations:   No consultations were requested during this admission          Brief History of Illness:   Admit after ETOP at outside institution.  Admit with fever and septic .   Under went D and C, hysteroscopy, IV antibiotics.            Hospital Course:   The patient's hospital course was unremarkable.  She recovered as anticipated and experienced no post-operative complications.           Discharge Instructions and Follow-Up:   Discharge diet: Regular   Discharge activity: No sex for 2 week(s)   Discharge follow-up: Follow up  with primary care provider in 2 weeks   Wound care Drink plenty of fluids           Discharge Disposition:   Discharged to home      Attestation:  I have reviewed today's vital signs, notes, medications, labs and imaging.    Pam Giraldo MD

## 2017-04-17 NOTE — PLAN OF CARE
Problem: Goal Outcome Summary  Goal: Goal Outcome Summary  Outcome: No Change  A/O, AVSS. Pain in jaw and abdomen adequately controlled with Ibuprofen. LS clear, IS 1500. BS hypoactive, passing flatus. Abdomen slightly distended. Small amount of vaginal bleeding pad changed. Ambulated to restroom with SBA. Patient states she feels shaky when up on her feet.

## 2017-04-17 NOTE — PROGRESS NOTES
Pt discharged home at 1827, mom providing transportation. IV removed. Discharge instructions and medications reviewed with patient, verbalized understanding. Written prescriptions and all belongings sent with patient.

## 2017-04-17 NOTE — PLAN OF CARE
Problem: Goal Outcome Summary  Goal: Goal Outcome Summary  Outcome: Improving  AVSS. Minimal Vaginal bleeding. Urine reddish. No pain with urination. Oxycodone and ibuprofen for pain.  Aqua K pad in use. IV antibiotics. Potentially home tomorrow.

## 2017-04-17 NOTE — PLAN OF CARE
Problem: Goal Outcome Summary  Goal: Goal Outcome Summary  Outcome: Improving  A&O. VSS. Pain managed with Norco and Ibuprofen. Pt feels that Norco gives her better pain control without making her sleepy. Up SBA. Voiding. Tolerating regular diet, +flatus. Abd distended.

## 2017-04-18 LAB
BACTERIA SPEC CULT: ABNORMAL
COPATH REPORT: NORMAL
MICRO REPORT STATUS: ABNORMAL
SPECIMEN SOURCE: ABNORMAL

## 2017-04-22 LAB
BACTERIA SPEC CULT: NO GROWTH
BACTERIA SPEC CULT: NO GROWTH
Lab: NORMAL
Lab: NORMAL
MICRO REPORT STATUS: NORMAL
MICRO REPORT STATUS: NORMAL
SPECIMEN SOURCE: NORMAL
SPECIMEN SOURCE: NORMAL

## 2018-04-19 NOTE — PHARMACY-ADMISSION MEDICATION HISTORY
Admission medication history interview status for the 4/16/2017  admission is complete. See EPIC admission navigator for prior to admission medications     Medication history source reliability:Good    Actions taken by pharmacist (provider contacted, etc):Discussed PTA med list with patient.      Additional medication history information not noted on PTA med list :None    Medication reconciliation/reorder completed by provider prior to medication history? No    Time spent in this activity: 10 minutes     Prior to Admission medications    Medication Sig Last Dose Taking? Auth Provider   IBUPROFEN PO Take 400 mg by mouth every 6 hours as needed for moderate pain 4/16/2017 at Unknown time Yes Unknown, Entered By History        Pt called to schedule an EMG for MOSS (dyspnea on exertion) [R06.09].  How many minutes does Dr. Banda need for this?

## 2020-12-30 ENCOUNTER — THERAPY VISIT (OUTPATIENT)
Dept: PHYSICAL THERAPY | Facility: CLINIC | Age: 25
End: 2020-12-30
Payer: COMMERCIAL

## 2020-12-30 DIAGNOSIS — M54.12 CERVICAL RADICULOPATHY: ICD-10-CM

## 2020-12-30 PROCEDURE — 97161 PT EVAL LOW COMPLEX 20 MIN: CPT | Mod: GP | Performed by: PHYSICAL THERAPIST

## 2020-12-30 PROCEDURE — 97110 THERAPEUTIC EXERCISES: CPT | Mod: GP | Performed by: PHYSICAL THERAPIST

## 2020-12-30 PROCEDURE — 97140 MANUAL THERAPY 1/> REGIONS: CPT | Mod: GP | Performed by: PHYSICAL THERAPIST
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
## 2023-02-15 ENCOUNTER — TELEPHONE (OUTPATIENT)
Dept: NEUROSURGERY | Facility: CLINIC | Age: 28
End: 2023-02-15
Payer: COMMERCIAL

## 2023-02-15 NOTE — TELEPHONE ENCOUNTER
[facility] Call Center    Phone Message    May a detailed message be left on voicemail: yes     Reason for Call: Other: Patient is requesting to schedule an appointment for left arm pain and cervical disease with myopathy. Patient was referredt by [facility] by Dr. Saji Melendez. Please call patient back to schedule     Action Taken: Message routed to:  [facility] ([facility]    Travel Screening: Not Applicable

## 2024-12-15 ENCOUNTER — OFFICE VISIT (OUTPATIENT)
Dept: URGENT CARE | Facility: URGENT CARE | Age: 29
End: 2024-12-15
Payer: COMMERCIAL

## 2024-12-15 VITALS
SYSTOLIC BLOOD PRESSURE: 119 MMHG | WEIGHT: 164.9 LBS | HEART RATE: 59 BPM | OXYGEN SATURATION: 100 % | BODY MASS INDEX: 28.31 KG/M2 | DIASTOLIC BLOOD PRESSURE: 73 MMHG | TEMPERATURE: 96.9 F

## 2024-12-15 DIAGNOSIS — B96.89 BACTERIAL VAGINOSIS: Primary | ICD-10-CM

## 2024-12-15 DIAGNOSIS — N76.0 BACTERIAL VAGINOSIS: Primary | ICD-10-CM

## 2024-12-15 DIAGNOSIS — R30.0 DYSURIA: ICD-10-CM

## 2024-12-15 DIAGNOSIS — N89.8 VAGINAL ODOR: ICD-10-CM

## 2024-12-15 DIAGNOSIS — N89.8 VAGINAL ITCHING: ICD-10-CM

## 2024-12-15 DIAGNOSIS — N89.8 VAGINAL DISCHARGE: ICD-10-CM

## 2024-12-15 LAB
ALBUMIN UR-MCNC: ABNORMAL MG/DL
APPEARANCE UR: CLEAR
BACTERIA #/AREA URNS HPF: ABNORMAL /HPF
BILIRUB UR QL STRIP: NEGATIVE
CLUE CELLS: PRESENT
COLOR UR AUTO: YELLOW
GLUCOSE UR STRIP-MCNC: NEGATIVE MG/DL
HGB UR QL STRIP: NEGATIVE
KETONES UR STRIP-MCNC: NEGATIVE MG/DL
LEUKOCYTE ESTERASE UR QL STRIP: NEGATIVE
MUCOUS THREADS #/AREA URNS LPF: PRESENT /LPF
NITRATE UR QL: NEGATIVE
PH UR STRIP: 5.5 [PH] (ref 5–7)
RBC #/AREA URNS AUTO: ABNORMAL /HPF
SP GR UR STRIP: >=1.03 (ref 1–1.03)
SQUAMOUS #/AREA URNS AUTO: ABNORMAL /LPF
TRICHOMONAS, WET PREP: ABNORMAL
UROBILINOGEN UR STRIP-ACNC: 0.2 E.U./DL
WBC #/AREA URNS AUTO: ABNORMAL /HPF
WBC'S/HIGH POWER FIELD, WET PREP: ABNORMAL
YEAST, WET PREP: ABNORMAL

## 2024-12-15 PROCEDURE — 99204 OFFICE O/P NEW MOD 45 MIN: CPT | Performed by: INTERNAL MEDICINE

## 2024-12-15 PROCEDURE — 81001 URINALYSIS AUTO W/SCOPE: CPT | Performed by: INTERNAL MEDICINE

## 2024-12-15 PROCEDURE — 87591 N.GONORRHOEAE DNA AMP PROB: CPT | Performed by: INTERNAL MEDICINE

## 2024-12-15 PROCEDURE — 87210 SMEAR WET MOUNT SALINE/INK: CPT | Performed by: INTERNAL MEDICINE

## 2024-12-15 PROCEDURE — 87491 CHLMYD TRACH DNA AMP PROBE: CPT | Performed by: INTERNAL MEDICINE

## 2024-12-15 RX ORDER — METRONIDAZOLE 500 MG/1
500 TABLET ORAL 2 TIMES DAILY
Qty: 14 TABLET | Refills: 0 | Status: SHIPPED | OUTPATIENT
Start: 2024-12-15

## 2024-12-15 RX ORDER — METRONIDAZOLE 500 MG/1
500 TABLET ORAL 2 TIMES DAILY
Qty: 14 TABLET | Refills: 0 | Status: SHIPPED | OUTPATIENT
Start: 2024-12-15 | End: 2024-12-15

## 2024-12-15 ASSESSMENT — ENCOUNTER SYMPTOMS: DYSURIA: 0

## 2024-12-15 NOTE — PROGRESS NOTES
ASSESSMENT AND PLAN:      ICD-10-CM    1. Bacterial vaginosis  N76.0 metroNIDAZOLE (FLAGYL) 500 MG tablet    B96.89       2. Vaginal itching  N89.8 Wet prep - lab collect     Chlamydia trachomatis/Neisseria gonorrhoeae by PCR - Clinic Collect     Wet prep - lab collect      3. Vaginal odor  N89.8 Wet prep - lab collect     Chlamydia trachomatis/Neisseria gonorrhoeae by PCR - Clinic Collect     Wet prep - lab collect      4. Vaginal discharge  N89.8 Wet prep - lab collect     Chlamydia trachomatis/Neisseria gonorrhoeae by PCR - Clinic Collect     Wet prep - lab collect      5. Dysuria  R30.0 UA Macroscopic with reflex to Microscopic and Culture - Lab Collect     UA Macroscopic with reflex to Microscopic and Culture - Lab Collect     UA Microscopic with Reflex to Culture          Patient Instructions   Flagyl 2 x day for 7 days      Boric acid vaginal suppositories as needed    STD testing gonorrhea and chlamydia.  [place]  No follow-ups on file.        Tameka Medeiros MD  [facility]    Subjective     Alvin VASYL Suazo is a 29 year old who presents for Patient presents with:  Vaginal Problem: Possible BV  sx odor, itching and discharge going on for 3 days.  Pt would like STD check     a new patient of [facility].    Here today with concerns of bacterial vaginosis  infection  GYN Complaint    Onset of symptoms was 3 day(s) ago.    Current and Associated symptoms: vaginal discharge , local irritation, vulvar itching, and odor  Treatment measures tried include:  metro pill 1 month ago, symptoms returned  Sexually active: yes, single partner, contraception - none  Predisposing factors: None  Hx of previous symptom: frequent    Review of Systems   Genitourinary:  Negative for dysuria.           Objective    /73 (BP Location: Left arm, Patient Position: Sitting, Cuff Size: Adult Regular)   Pulse 59   Temp 96.9  F (36.1  C) (Tympanic)   Wt 74.8 kg (164 lb  14.4 oz)   LMP  (LMP Unknown)   SpO2 100%   BMI 28.31 kg/m    Physical Exam  Vitals reviewed.   Constitutional:       Appearance: Normal appearance. She is not ill-appearing.   Neurological:      Mental Status: She is alert.            Results for orders placed or performed in visit on 12/15/24 (from the past 24 hours)   Wet prep - lab collect    Specimen: Vagina; Swab   Result Value Ref Range    Trichomonas Absent Absent    Yeast Absent Absent    Clue Cells Present (A) Absent    WBCs/high power field 1+ (A) None   UA Macroscopic with reflex to Microscopic and Culture - Lab Collect    Specimen: Urine, Clean Catch   Result Value Ref Range    Color Urine Yellow Colorless, Straw, Light Yellow, Yellow    Appearance Urine Clear Clear    Glucose Urine Negative Negative mg/dL    Bilirubin Urine Negative Negative    Ketones Urine Negative Negative mg/dL    Specific Gravity Urine >=1.030 1.003 - 1.035    Blood Urine Negative Negative    pH Urine 5.5 5.0 - 7.0    Protein Albumin Urine Trace (A) Negative mg/dL    Urobilinogen Urine 0.2 0.2, 1.0 E.U./dL    Nitrite Urine Negative Negative    Leukocyte Esterase Urine Negative Negative

## 2024-12-15 NOTE — PATIENT INSTRUCTIONS
Flagyl 2 x day for 7 days      Boric acid vaginal suppositories as needed    STD testing gonorrhea and chlamydia.  [facility]

## 2024-12-16 LAB
C TRACH DNA SPEC QL PROBE+SIG AMP: NEGATIVE
N GONORRHOEA DNA SPEC QL NAA+PROBE: NEGATIVE

## (undated) DEVICE — GLOVE PROTEXIS W/NEU-THERA 6.5  2D73TE65

## (undated) DEVICE — TUBING VACUUM COLLECTION 6FT 23116

## (undated) DEVICE — GLOVE PROTEXIS W/NEU-THERA 7.0  2D73TE70

## (undated) DEVICE — TUBING IRRIG TUR Y TYPE 96" LF 6543-01

## (undated) DEVICE — PACK TVT HYSTEROSCOPY SMA15HYFSE

## (undated) DEVICE — GLOVE PROTEXIS MICRO 6.0  2D73PM60

## (undated) DEVICE — CATH INTERMITTENT CLEAN-CATH FEMALE 14FR 6" VINYL LF 420614

## (undated) DEVICE — SOL GLYCINE 1.5% 3000ML BAG 2B7317

## (undated) DEVICE — SOL NACL 0.9% IRRIG 1000ML BOTTLE 07138-09

## (undated) DEVICE — SOL NACL 0.9% INJ 1000ML BAG 2B1324X

## (undated) DEVICE — SUCTION CANISTER BEMIS HI FLOW 006772-901

## (undated) DEVICE — SUCTION CANISTER MEDIVAC LINER 3000ML W/LID 65651-530

## (undated) DEVICE — LINEN TOWEL PACK X5 5464

## (undated) DEVICE — DRAPE POUCH IRR 1016

## (undated) RX ORDER — MEPERIDINE HYDROCHLORIDE 25 MG/ML
INJECTION INTRAMUSCULAR; INTRAVENOUS; SUBCUTANEOUS
Status: DISPENSED
Start: 2017-04-16